# Patient Record
Sex: FEMALE | Race: ASIAN | NOT HISPANIC OR LATINO | URBAN - METROPOLITAN AREA
[De-identification: names, ages, dates, MRNs, and addresses within clinical notes are randomized per-mention and may not be internally consistent; named-entity substitution may affect disease eponyms.]

---

## 2023-07-18 ENCOUNTER — OFFICE VISIT (OUTPATIENT)
Dept: URBAN - METROPOLITAN AREA CLINIC 118 | Facility: CLINIC | Age: 78
End: 2023-07-18

## 2023-07-19 ENCOUNTER — OFFICE VISIT (OUTPATIENT)
Dept: URBAN - METROPOLITAN AREA CLINIC 109 | Facility: CLINIC | Age: 78
End: 2023-07-19

## 2023-09-20 ENCOUNTER — LAB OUTSIDE AN ENCOUNTER (OUTPATIENT)
Dept: URBAN - METROPOLITAN AREA CLINIC 118 | Facility: CLINIC | Age: 78
End: 2023-09-20

## 2023-09-20 ENCOUNTER — OFFICE VISIT (OUTPATIENT)
Dept: URBAN - METROPOLITAN AREA CLINIC 118 | Facility: CLINIC | Age: 78
End: 2023-09-20
Payer: COMMERCIAL

## 2023-09-20 VITALS
WEIGHT: 129.8 LBS | HEIGHT: 58 IN | HEART RATE: 64 BPM | BODY MASS INDEX: 27.25 KG/M2 | SYSTOLIC BLOOD PRESSURE: 180 MMHG | DIASTOLIC BLOOD PRESSURE: 88 MMHG | TEMPERATURE: 97.5 F

## 2023-09-20 DIAGNOSIS — B18.2 CHRONIC HEPATITIS C WITHOUT HEPATIC COMA: ICD-10-CM

## 2023-09-20 PROBLEM — 128302006: Status: ACTIVE | Noted: 2023-09-20

## 2023-09-20 PROCEDURE — 99203 OFFICE O/P NEW LOW 30 MIN: CPT | Performed by: INTERNAL MEDICINE

## 2023-09-20 RX ORDER — HYDROXYZINE HYDROCHLORIDE 25 MG/1
TABLET, FILM COATED ORAL
Qty: 30 TABLET | Status: ACTIVE | COMMUNITY

## 2023-09-20 RX ORDER — VELPATASVIR AND SOFOSBUVIR 100; 400 MG/1; MG/1
1 TABLET TABLET, FILM COATED ORAL ONCE A DAY
Qty: 28 TABLET | Refills: 2 | OUTPATIENT
Start: 2023-09-20 | End: 2023-12-12

## 2023-09-20 RX ORDER — CARVEDILOL 12.5 MG/1
TAKE 1 TABLET BY MOUTH EVERY DAY TABLET, FILM COATED ORAL
Qty: 90 EACH | Refills: 0 | Status: ACTIVE | COMMUNITY

## 2023-09-20 NOTE — HPI-TODAY'S VISIT:
The patient is seen with her niece, who provides translation.  She was referred by Dr. Guthrie for hepatitis C.  The patient was unaware of hepatitis infection, and has normal liver enzymes.  This was found on routine antibody screening.  She does have a positive viral load.  There is no family history of hepatitis, cirrhosis, or liver cancer.  The patient has no right upper quadrant pain, nausea, vomiting, jaundice, itching, or abnormal loss of weight.

## 2023-09-21 ENCOUNTER — TELEPHONE ENCOUNTER (OUTPATIENT)
Dept: URBAN - METROPOLITAN AREA CLINIC 118 | Facility: CLINIC | Age: 78
End: 2023-09-21

## 2023-09-24 LAB
ABSOLUTE BASOPHILS: 27
ABSOLUTE EOSINOPHILS: 367
ABSOLUTE LYMPHOCYTES: 1482
ABSOLUTE MONOCYTES: 734
ABSOLUTE NEUTROPHILS: 4189
AFP, SERUM, TUMOR MARKER: 4.8
ALBUMIN/GLOBULIN RATIO: 1.6
ALBUMIN: 4.6
ALKALINE PHOSPHATASE: 62
ALT (SGPT): 36
AST (SGOT): 34
BASOPHILS: 0.4
BILIRUBIN, DIRECT: 0.1
BILIRUBIN, INDIRECT: 0.5
BILIRUBIN, TOTAL: 0.6
BUN/CREATININE RATIO: (no result)
CALCIUM: 9.6
CARBON DIOXIDE: 26
CHLORIDE: 106
CREATININE: 0.66
EGFR: 91
EOSINOPHILS: 5.4
GLOBULIN: 2.9
GLUCOSE: 97
HCV RNA, QUANTITATIVE: (no result)
HCV RNA, QUANTITATIVE: 7.13
HEMATOCRIT: 41.5
HEMOGLOBIN: 14.1
HEPATITIS C VIRAL RNA: 6
LYMPHOCYTES: 21.8
MCH: 30.4
MCHC: 34
MCV: 89.4
MONOCYTES: 10.8
MPV: 10.7
NEUTROPHILS: 61.6
PLATELET COUNT: 258
POTASSIUM: 4.2
PROTEIN, TOTAL: 7.5
RDW: 12.2
RED BLOOD CELL COUNT: 4.64
SODIUM: 143
UREA NITROGEN (BUN): 10
WHITE BLOOD CELL COUNT: 6.8

## 2023-09-27 ENCOUNTER — TELEPHONE ENCOUNTER (OUTPATIENT)
Dept: URBAN - METROPOLITAN AREA CLINIC 118 | Facility: CLINIC | Age: 78
End: 2023-09-27

## 2023-10-02 ENCOUNTER — TELEPHONE ENCOUNTER (OUTPATIENT)
Dept: URBAN - METROPOLITAN AREA CLINIC 118 | Facility: CLINIC | Age: 78
End: 2023-10-02

## 2023-10-02 ENCOUNTER — OFFICE VISIT (OUTPATIENT)
Dept: URBAN - METROPOLITAN AREA CLINIC 117 | Facility: CLINIC | Age: 78
End: 2023-10-02

## 2023-10-09 ENCOUNTER — OFFICE VISIT (OUTPATIENT)
Dept: URBAN - METROPOLITAN AREA CLINIC 117 | Facility: CLINIC | Age: 78
End: 2023-10-09
Payer: COMMERCIAL

## 2023-10-09 DIAGNOSIS — B17.10 HEPATITIS C: ICD-10-CM

## 2023-10-09 PROCEDURE — 76705 ECHO EXAM OF ABDOMEN: CPT | Performed by: INTERNAL MEDICINE

## 2023-11-28 ENCOUNTER — LAB OUTSIDE AN ENCOUNTER (OUTPATIENT)
Dept: URBAN - METROPOLITAN AREA CLINIC 118 | Facility: CLINIC | Age: 78
End: 2023-11-28

## 2023-11-28 ENCOUNTER — OFFICE VISIT (OUTPATIENT)
Dept: URBAN - METROPOLITAN AREA CLINIC 118 | Facility: CLINIC | Age: 78
End: 2023-11-28
Payer: COMMERCIAL

## 2023-11-28 VITALS
SYSTOLIC BLOOD PRESSURE: 151 MMHG | DIASTOLIC BLOOD PRESSURE: 79 MMHG | BODY MASS INDEX: 27.16 KG/M2 | HEART RATE: 57 BPM | TEMPERATURE: 97.5 F | HEIGHT: 58 IN | WEIGHT: 129.4 LBS

## 2023-11-28 DIAGNOSIS — B18.2 CHRONIC HEPATITIS C WITHOUT HEPATIC COMA: ICD-10-CM

## 2023-11-28 PROCEDURE — 99213 OFFICE O/P EST LOW 20 MIN: CPT | Performed by: INTERNAL MEDICINE

## 2023-11-28 RX ORDER — ALENDRONATE SODIUM 70 MG/1
TABLET ORAL
Qty: 12 TABLET | Status: ACTIVE | COMMUNITY

## 2023-11-28 RX ORDER — VELPATASVIR AND SOFOSBUVIR 100; 400 MG/1; MG/1
1 TABLET TABLET, FILM COATED ORAL ONCE A DAY
Qty: 28 TABLET | Refills: 2 | Status: ACTIVE | COMMUNITY
Start: 2023-09-20 | End: 2023-12-12

## 2023-11-28 RX ORDER — HYDROXYZINE HYDROCHLORIDE 25 MG/1
TABLET, FILM COATED ORAL
Qty: 30 TABLET | Status: ACTIVE | COMMUNITY

## 2023-11-28 RX ORDER — LOSARTAN POTASSIUM AND HYDROCHLOROTHIAZIDE 100; 12.5 MG/1; MG/1
TAKE 1 TABLET BY MOUTH EVERY DAY TABLET, FILM COATED ORAL
Qty: 90 EACH | Refills: 0 | Status: ACTIVE | COMMUNITY

## 2023-11-28 RX ORDER — CARVEDILOL 12.5 MG/1
TAKE 1 TABLET BY MOUTH EVERY DAY TABLET, FILM COATED ORAL
Qty: 90 EACH | Refills: 0 | Status: ACTIVE | COMMUNITY

## 2023-11-28 NOTE — HPI-TODAY'S VISIT:
The patient is following up after an office visit in September.  She is now on her second bottle of Epclusa for hepatitis C.  She has no nausea, vomiting, weight loss, or significant abdominal pain.  She is compliant with therapy and has not missed any doses, according to her daughter who was present during the interview.

## 2023-11-30 LAB
ABSOLUTE BASOPHILS: 63
ABSOLUTE EOSINOPHILS: 428
ABSOLUTE LYMPHOCYTES: 1579
ABSOLUTE MONOCYTES: 650
ABSOLUTE NEUTROPHILS: 2981
ALBUMIN/GLOBULIN RATIO: 1.4
ALBUMIN: 4.5
ALKALINE PHOSPHATASE: 53
ALT (SGPT): 19
AST (SGOT): 22
BASOPHILS: 1.1
BILIRUBIN, DIRECT: 0.1
BILIRUBIN, INDIRECT: 0.5
BILIRUBIN, TOTAL: 0.6
BUN/CREATININE RATIO: (no result)
CALCIUM: 9.6
CARBON DIOXIDE: 27
CHLORIDE: 104
CREATININE: 0.64
EGFR: 92
EOSINOPHILS: 7.5
GLOBULIN: 3.3
GLUCOSE: 95
HCV RNA, QUANTITATIVE: <1.18
HCV RNA, QUANTITATIVE: <15
HEMATOCRIT: 43.1
HEMOGLOBIN: 14.2
LYMPHOCYTES: 27.7
MCH: 29.6
MCHC: 32.9
MCV: 90
MONOCYTES: 11.4
MPV: 10.8
NEUTROPHILS: 52.3
PLATELET COUNT: 267
POTASSIUM: 4.7
PROTEIN, TOTAL: 7.8
RDW: 12.1
RED BLOOD CELL COUNT: 4.79
SODIUM: 139
UREA NITROGEN (BUN): 13
WHITE BLOOD CELL COUNT: 5.7

## 2024-02-28 ENCOUNTER — LAB (OUTPATIENT)
Dept: URBAN - METROPOLITAN AREA CLINIC 118 | Facility: CLINIC | Age: 79
End: 2024-02-28

## 2024-02-28 ENCOUNTER — OV EP (OUTPATIENT)
Dept: URBAN - METROPOLITAN AREA CLINIC 118 | Facility: CLINIC | Age: 79
End: 2024-02-28

## 2024-02-28 VITALS
HEIGHT: 58 IN | BODY MASS INDEX: 26.7 KG/M2 | TEMPERATURE: 97.9 F | SYSTOLIC BLOOD PRESSURE: 184 MMHG | WEIGHT: 127.2 LBS | DIASTOLIC BLOOD PRESSURE: 86 MMHG | HEART RATE: 66 BPM

## 2024-02-28 RX ORDER — CARVEDILOL 12.5 MG/1
TAKE 1 TABLET BY MOUTH EVERY DAY TABLET, FILM COATED ORAL
Qty: 90 EACH | Refills: 0 | Status: ACTIVE | COMMUNITY

## 2024-02-28 RX ORDER — HYDROXYZINE HYDROCHLORIDE 25 MG/1
TABLET, FILM COATED ORAL
Qty: 30 TABLET | Status: ACTIVE | COMMUNITY

## 2024-02-28 RX ORDER — ALENDRONATE SODIUM 70 MG/1
TABLET ORAL
Qty: 12 TABLET | Status: ACTIVE | COMMUNITY

## 2024-02-28 RX ORDER — LOSARTAN POTASSIUM AND HYDROCHLOROTHIAZIDE 100; 12.5 MG/1; MG/1
TAKE 1 TABLET BY MOUTH EVERY DAY TABLET, FILM COATED ORAL
Qty: 90 EACH | Refills: 0 | Status: ACTIVE | COMMUNITY

## 2024-02-28 RX ORDER — ATORVASTATIN CALCIUM, FILM COATED 20 MG/1
TABLET ORAL
Qty: 90 TABLET | Status: ACTIVE | COMMUNITY

## 2024-08-27 ENCOUNTER — LAB OUTSIDE AN ENCOUNTER (OUTPATIENT)
Dept: URBAN - METROPOLITAN AREA CLINIC 118 | Facility: CLINIC | Age: 79
End: 2024-08-27

## 2024-08-27 ENCOUNTER — DASHBOARD ENCOUNTERS (OUTPATIENT)
Age: 79
End: 2024-08-27

## 2024-08-27 ENCOUNTER — OFFICE VISIT (OUTPATIENT)
Dept: URBAN - METROPOLITAN AREA CLINIC 118 | Facility: CLINIC | Age: 79
End: 2024-08-27
Payer: MEDICARE

## 2024-08-27 VITALS
HEIGHT: 58 IN | BODY MASS INDEX: 27.04 KG/M2 | HEART RATE: 71 BPM | SYSTOLIC BLOOD PRESSURE: 185 MMHG | WEIGHT: 128.8 LBS | DIASTOLIC BLOOD PRESSURE: 82 MMHG | TEMPERATURE: 97.7 F

## 2024-08-27 DIAGNOSIS — B18.2 CHRONIC HEPATITIS C WITHOUT HEPATIC COMA: ICD-10-CM

## 2024-08-27 PROCEDURE — 99213 OFFICE O/P EST LOW 20 MIN: CPT | Performed by: INTERNAL MEDICINE

## 2024-08-27 RX ORDER — LOSARTAN POTASSIUM AND HYDROCHLOROTHIAZIDE 100; 12.5 MG/1; MG/1
TAKE 1 TABLET BY MOUTH EVERY DAY TABLET, FILM COATED ORAL
Qty: 90 EACH | Refills: 0 | Status: ACTIVE | COMMUNITY

## 2024-08-27 RX ORDER — ALENDRONATE SODIUM 70 MG/1
TABLET ORAL
Qty: 12 TABLET | Status: ACTIVE | COMMUNITY

## 2024-08-27 RX ORDER — HYDROXYZINE HYDROCHLORIDE 25 MG/1
TABLET, FILM COATED ORAL
Qty: 30 TABLET | Status: ACTIVE | COMMUNITY

## 2024-08-27 RX ORDER — CARVEDILOL 12.5 MG/1
TAKE 1 TABLET BY MOUTH EVERY DAY TABLET, FILM COATED ORAL
Qty: 90 EACH | Refills: 0 | Status: ACTIVE | COMMUNITY

## 2024-08-27 RX ORDER — ATORVASTATIN CALCIUM, FILM COATED 20 MG/1
TABLET ORAL
Qty: 90 TABLET | Status: ACTIVE | COMMUNITY

## 2024-08-27 NOTE — HPI-TODAY'S VISIT:
The patient is following up after treatment with Epclusa for hepatitis C.  She was seen with her family, who provide translation.  She has no nausea, vomiting, abdominal pain and had no symptoms during the treatment.  Her end of treatment labs in February were unremarkable.  She has no family history of liver cancer.

## 2024-08-29 LAB
AFP, SERUM, TUMOR MARKER: 4.6
ALBUMIN/GLOBULIN RATIO: 1.4
ALBUMIN: 4.5
ALKALINE PHOSPHATASE: 56
ALT (SGPT): 19
AST (SGOT): 22
BILIRUBIN, DIRECT: 0.1
BILIRUBIN, INDIRECT: 0.6
BILIRUBIN, TOTAL: 0.7
GLOBULIN: 3.2
HCV RNA, QUANTITATIVE: <1.18
HCV RNA, QUANTITATIVE: <15
PROTEIN, TOTAL: 7.7